# Patient Record
Sex: FEMALE | Race: WHITE | NOT HISPANIC OR LATINO | Employment: UNEMPLOYED | URBAN - METROPOLITAN AREA
[De-identification: names, ages, dates, MRNs, and addresses within clinical notes are randomized per-mention and may not be internally consistent; named-entity substitution may affect disease eponyms.]

---

## 2024-02-29 ENCOUNTER — OFFICE VISIT (OUTPATIENT)
Dept: PRIMARY CARE | Facility: CLINIC | Age: 44
End: 2024-02-29
Payer: MEDICARE

## 2024-02-29 VITALS
OXYGEN SATURATION: 98 % | SYSTOLIC BLOOD PRESSURE: 116 MMHG | HEART RATE: 85 BPM | WEIGHT: 155 LBS | BODY MASS INDEX: 28.52 KG/M2 | DIASTOLIC BLOOD PRESSURE: 74 MMHG | HEIGHT: 62 IN

## 2024-02-29 DIAGNOSIS — Z13.220 LIPID SCREENING: ICD-10-CM

## 2024-02-29 DIAGNOSIS — G43.811 OTHER MIGRAINE WITH STATUS MIGRAINOSUS, INTRACTABLE: ICD-10-CM

## 2024-02-29 DIAGNOSIS — K21.00 GASTROESOPHAGEAL REFLUX DISEASE WITH ESOPHAGITIS WITHOUT HEMORRHAGE: Primary | ICD-10-CM

## 2024-02-29 DIAGNOSIS — G47.00 INSOMNIA, UNSPECIFIED TYPE: ICD-10-CM

## 2024-02-29 PROBLEM — K21.9 ACID REFLUX: Status: ACTIVE | Noted: 2024-02-29

## 2024-02-29 PROBLEM — E55.9 VITAMIN D DEFICIENCY: Status: ACTIVE | Noted: 2024-02-29

## 2024-02-29 PROCEDURE — 99214 OFFICE O/P EST MOD 30 MIN: CPT | Performed by: NURSE PRACTITIONER

## 2024-02-29 PROCEDURE — 1036F TOBACCO NON-USER: CPT | Performed by: NURSE PRACTITIONER

## 2024-02-29 RX ORDER — PANTOPRAZOLE SODIUM 40 MG/1
40 TABLET, DELAYED RELEASE ORAL DAILY
Qty: 30 TABLET | Refills: 5 | Status: SHIPPED | OUTPATIENT
Start: 2024-02-29

## 2024-02-29 ASSESSMENT — ENCOUNTER SYMPTOMS: ABDOMINAL PAIN: 1

## 2024-02-29 NOTE — PROGRESS NOTES
"Subjective   Patient ID: Ratna Bauer is a 43 y.o. female who presents for Migraine (Patient has a headache for the past couple days. Patient has taken ibuprofen. ), Nausea, and Abdominal Pain (Patient stated that abdominal pain has started last night ).    43-year-old female here for acute visit.  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  She has not seen GYN in over a year.    X 2 months- fatigued. Changed diet was sick in Dec then fell off track.   Started 2 days ago started with headache then dizzy no vertigo. Gets nauseated. If move head too fast will get dizzy.   HX migraine. Has only taken advil and a medicine from her  \"he is a nurse practitioner and says they are good\". Does not know the name of the medication.   In the past had been so dizzy could not get out of bed   HX Gastritis which has been bad in the last 2 days. Was on meds but ran out. Pain all night last night epigastric area. Could not sleep last night due to the pain. Has not seen GI for this.   8/2022 saw Dr Dick was treated for same S&S started on pantoprazole 40 mg po every day. Pt states this worked well. Pain went away. She ran out of meds and the pain is back.     Migraine   Associated symptoms include abdominal pain.   Abdominal Pain         Review of Systems   Gastrointestinal:  Positive for abdominal pain.       Objective   /74   Pulse 85   Ht 1.575 m (5' 2\")   Wt 70.3 kg (155 lb)   SpO2 98%   BMI 28.35 kg/m²     Physical Exam  Constitutional:       Appearance: Normal appearance.   Cardiovascular:      Rate and Rhythm: Normal rate and regular rhythm.   Pulmonary:      Effort: Pulmonary effort is normal.      Breath sounds: Normal breath sounds.   Abdominal:      General: Abdomen is flat. Bowel sounds are normal.      Palpations: Abdomen is soft.      Comments: She has mild pressure in the epigastric area with deep palpation otherwise no abdominal pain   Neurological:      Mental Status: She is alert and " oriented to person, place, and time.   Psychiatric:         Mood and Affect: Mood normal.         Assessment/Plan   Diagnoses and all orders for this visit:  Gastroesophageal reflux disease with esophagitis without hemorrhage  Comments:  Restart the PPI.  Pantoprazole 40 mg p.o. daily.  If no improvement will need to see GI  Orders:  -     Comprehensive Metabolic Panel; Future  -     CBC and Auto Differential; Future  -     TSH with reflex to Free T4 if abnormal; Future  -     pantoprazole (ProtoNix) 40 mg EC tablet; Take 1 tablet (40 mg) by mouth once daily. Do not crush, chew, or split.  Insomnia, unspecified type  Comments:  I will not prescribe sleeping pills for her.  She is aware of that.  Try over-the-counter magnesium glycinate 1 to 200 mg p.o. nightly  Orders:  -     TSH with reflex to Free T4 if abnormal; Future  Other migraine with status migrainosus, intractable  Comments:  History of migraine uses OTC meds.  This 1 is not resolving.  Sample of Nurtec given 75 mg x 1 now can repeat again after 2 hours.  New prescription sent  Orders:  -     TSH with reflex to Free T4 if abnormal; Future  -     rimegepant (Nurtec ODT) 75 mg tablet,disintegrating; Take 1 tab at onset of headache. If headache persists >2 hrs ok to take 2nd tab.  Lipid screening  Comments:  Update fasting labs  Orders:  -     Lipid Panel; Future  Other orders  -     Follow Up In Primary Care - Health Maintenance; Future    Spent a long time since she has had blood work on to make sure that nothing is missed.  Will update CBC, CMP, TSH, fasting lipid panel  Follow-up in 1 month to make sure she is doing well

## 2024-03-01 ENCOUNTER — LAB (OUTPATIENT)
Dept: LAB | Facility: LAB | Age: 44
End: 2024-03-01
Payer: MEDICARE

## 2024-03-01 DIAGNOSIS — Z13.220 LIPID SCREENING: ICD-10-CM

## 2024-03-01 DIAGNOSIS — G43.811 OTHER MIGRAINE WITH STATUS MIGRAINOSUS, INTRACTABLE: ICD-10-CM

## 2024-03-01 DIAGNOSIS — G47.00 INSOMNIA, UNSPECIFIED TYPE: ICD-10-CM

## 2024-03-01 DIAGNOSIS — K21.00 GASTROESOPHAGEAL REFLUX DISEASE WITH ESOPHAGITIS WITHOUT HEMORRHAGE: ICD-10-CM

## 2024-03-01 LAB
ALBUMIN SERPL BCP-MCNC: 3.9 G/DL (ref 3.4–5)
ALP SERPL-CCNC: 94 U/L (ref 33–110)
ALT SERPL W P-5'-P-CCNC: 12 U/L (ref 7–45)
ANION GAP SERPL CALC-SCNC: 12 MMOL/L (ref 10–20)
AST SERPL W P-5'-P-CCNC: 13 U/L (ref 9–39)
BASOPHILS # BLD AUTO: 0.03 X10*3/UL (ref 0–0.1)
BASOPHILS NFR BLD AUTO: 0.5 %
BILIRUB SERPL-MCNC: 0.4 MG/DL (ref 0–1.2)
BUN SERPL-MCNC: 15 MG/DL (ref 6–23)
CALCIUM SERPL-MCNC: 9.8 MG/DL (ref 8.6–10.3)
CHLORIDE SERPL-SCNC: 102 MMOL/L (ref 98–107)
CHOLEST SERPL-MCNC: 136 MG/DL (ref 0–199)
CHOLESTEROL/HDL RATIO: 3.7
CO2 SERPL-SCNC: 29 MMOL/L (ref 21–32)
CREAT SERPL-MCNC: 0.82 MG/DL (ref 0.5–1.05)
EGFRCR SERPLBLD CKD-EPI 2021: >90 ML/MIN/1.73M*2
EOSINOPHIL # BLD AUTO: 0.08 X10*3/UL (ref 0–0.7)
EOSINOPHIL NFR BLD AUTO: 1.2 %
ERYTHROCYTE [DISTWIDTH] IN BLOOD BY AUTOMATED COUNT: 17.3 % (ref 11.5–14.5)
GLUCOSE SERPL-MCNC: 90 MG/DL (ref 74–99)
HCT VFR BLD AUTO: 38.6 % (ref 36–46)
HDLC SERPL-MCNC: 36.9 MG/DL
HGB BLD-MCNC: 11.3 G/DL (ref 12–16)
IMM GRANULOCYTES # BLD AUTO: 0.01 X10*3/UL (ref 0–0.7)
IMM GRANULOCYTES NFR BLD AUTO: 0.2 % (ref 0–0.9)
LDLC SERPL CALC-MCNC: 85 MG/DL
LYMPHOCYTES # BLD AUTO: 1.95 X10*3/UL (ref 1.2–4.8)
LYMPHOCYTES NFR BLD AUTO: 30.1 %
MCH RBC QN AUTO: 24.7 PG (ref 26–34)
MCHC RBC AUTO-ENTMCNC: 29.3 G/DL (ref 32–36)
MCV RBC AUTO: 85 FL (ref 80–100)
MONOCYTES # BLD AUTO: 0.46 X10*3/UL (ref 0.1–1)
MONOCYTES NFR BLD AUTO: 7.1 %
NEUTROPHILS # BLD AUTO: 3.95 X10*3/UL (ref 1.2–7.7)
NEUTROPHILS NFR BLD AUTO: 60.9 %
NON HDL CHOLESTEROL: 99 MG/DL (ref 0–149)
NRBC BLD-RTO: 0 /100 WBCS (ref 0–0)
PLATELET # BLD AUTO: 323 X10*3/UL (ref 150–450)
POTASSIUM SERPL-SCNC: 4.6 MMOL/L (ref 3.5–5.3)
PROT SERPL-MCNC: 7 G/DL (ref 6.4–8.2)
RBC # BLD AUTO: 4.57 X10*6/UL (ref 4–5.2)
SODIUM SERPL-SCNC: 138 MMOL/L (ref 136–145)
TRIGL SERPL-MCNC: 73 MG/DL (ref 0–149)
TSH SERPL-ACNC: 0.93 MIU/L (ref 0.44–3.98)
VLDL: 15 MG/DL (ref 0–40)
WBC # BLD AUTO: 6.5 X10*3/UL (ref 4.4–11.3)

## 2024-03-01 PROCEDURE — 80061 LIPID PANEL: CPT

## 2024-03-01 PROCEDURE — 80053 COMPREHEN METABOLIC PANEL: CPT

## 2024-03-01 PROCEDURE — 85025 COMPLETE CBC W/AUTO DIFF WBC: CPT

## 2024-03-01 PROCEDURE — 36415 COLL VENOUS BLD VENIPUNCTURE: CPT

## 2024-03-01 PROCEDURE — 84443 ASSAY THYROID STIM HORMONE: CPT

## 2024-03-04 ENCOUNTER — TELEPHONE (OUTPATIENT)
Dept: PRIMARY CARE | Facility: CLINIC | Age: 44
End: 2024-03-04
Payer: MEDICARE

## 2024-03-04 NOTE — TELEPHONE ENCOUNTER
Called and spoke with patient that her blood work looks pretty good.  She is a little bit anemic approximately the same as she was in 2022.  Trice would like to recheck this at her next visit once she has been on the PPI.   Otherwise kidney, liver, sugar, thyroid and lipids all look very good   Patient understood.

## 2024-05-29 ENCOUNTER — APPOINTMENT (OUTPATIENT)
Dept: PRIMARY CARE | Facility: CLINIC | Age: 44
End: 2024-05-29
Payer: MEDICARE

## 2024-07-11 ENCOUNTER — OFFICE VISIT (OUTPATIENT)
Dept: PRIMARY CARE | Facility: CLINIC | Age: 44
End: 2024-07-11
Payer: MEDICARE

## 2024-07-11 VITALS
SYSTOLIC BLOOD PRESSURE: 110 MMHG | HEART RATE: 66 BPM | BODY MASS INDEX: 27.64 KG/M2 | WEIGHT: 156 LBS | DIASTOLIC BLOOD PRESSURE: 74 MMHG | HEIGHT: 63 IN | OXYGEN SATURATION: 97 %

## 2024-07-11 DIAGNOSIS — Z13.220 LIPID SCREENING: Primary | ICD-10-CM

## 2024-07-11 DIAGNOSIS — Z00.00 WELLNESS EXAMINATION: ICD-10-CM

## 2024-07-11 DIAGNOSIS — K21.00 GASTROESOPHAGEAL REFLUX DISEASE WITH ESOPHAGITIS WITHOUT HEMORRHAGE: ICD-10-CM

## 2024-07-11 DIAGNOSIS — D50.9 IRON DEFICIENCY ANEMIA, UNSPECIFIED IRON DEFICIENCY ANEMIA TYPE: ICD-10-CM

## 2024-07-11 PROCEDURE — 1036F TOBACCO NON-USER: CPT | Performed by: NURSE PRACTITIONER

## 2024-07-11 PROCEDURE — 99213 OFFICE O/P EST LOW 20 MIN: CPT | Performed by: NURSE PRACTITIONER

## 2024-07-11 RX ORDER — PANTOPRAZOLE SODIUM 40 MG/1
40 TABLET, DELAYED RELEASE ORAL DAILY
Qty: 30 TABLET | Refills: 5 | Status: SHIPPED | OUTPATIENT
Start: 2024-07-11

## 2024-07-11 ASSESSMENT — PATIENT HEALTH QUESTIONNAIRE - PHQ9
1. LITTLE INTEREST OR PLEASURE IN DOING THINGS: NOT AT ALL
SUM OF ALL RESPONSES TO PHQ9 QUESTIONS 1 AND 2: 0
2. FEELING DOWN, DEPRESSED OR HOPELESS: NOT AT ALL

## 2024-07-11 NOTE — PROGRESS NOTES
"Subjective   Patient ID: Ratna Bauer is a 43 y.o. female who presents for Med Refill.    43 year old female here for refills. No acute concerns.   GERD, on pantoprazole. No reflux S&S. Has not seen GI.  States she has been to eat \"all the spicy foods a lot \"and no symptoms.  Labs from 3/2024 reviewed HGB minimally low 11.3. no change since 2022.   Overdue for annual physical and screening.    Med Refill         Review of Systems    Objective   /74   Pulse 66   Ht 1.6 m (5' 3\")   Wt 70.8 kg (156 lb)   SpO2 97%   BMI 27.63 kg/m²     Physical Exam  Constitutional:       Appearance: Normal appearance.   Cardiovascular:      Rate and Rhythm: Normal rate and regular rhythm.      Pulses: Normal pulses.      Heart sounds: Normal heart sounds.   Pulmonary:      Effort: Pulmonary effort is normal.      Breath sounds: Normal breath sounds.   Musculoskeletal:         General: Normal range of motion.   Neurological:      General: No focal deficit present.      Mental Status: She is alert and oriented to person, place, and time. Mental status is at baseline.   Psychiatric:         Mood and Affect: Mood normal.         Behavior: Behavior normal.         Assessment/Plan   Diagnoses and all orders for this visit:  Gastroesophageal reflux disease with esophagitis without hemorrhage  Comments:  Cont the  Pantoprazole 40 mg p.o. daily.  Orders:  -     pantoprazole (ProtoNix) 40 mg EC tablet; Take 1 tablet (40 mg) by mouth once daily. Do not crush, chew, or split.  Other orders  -     Follow Up In Primary Care - Health Maintenance; Future    Schedule her for September for annual physical including age-appropriate screening.  Update blood work.  Patient is agreeable to get her blood work done prior to that visit.     "

## 2024-09-11 ENCOUNTER — HOSPITAL ENCOUNTER (OUTPATIENT)
Dept: RADIOLOGY | Facility: HOSPITAL | Age: 44
Discharge: HOME | End: 2024-09-11
Payer: MEDICARE

## 2024-09-11 ENCOUNTER — APPOINTMENT (OUTPATIENT)
Dept: PRIMARY CARE | Facility: CLINIC | Age: 44
End: 2024-09-11
Payer: MEDICARE

## 2024-09-11 VITALS — WEIGHT: 157 LBS | BODY MASS INDEX: 27.82 KG/M2 | HEIGHT: 63 IN

## 2024-09-11 VITALS
OXYGEN SATURATION: 98 % | SYSTOLIC BLOOD PRESSURE: 119 MMHG | HEIGHT: 63 IN | BODY MASS INDEX: 27.82 KG/M2 | WEIGHT: 157 LBS | DIASTOLIC BLOOD PRESSURE: 70 MMHG | HEART RATE: 70 BPM

## 2024-09-11 DIAGNOSIS — Z00.00 WELLNESS EXAMINATION: Primary | ICD-10-CM

## 2024-09-11 DIAGNOSIS — Z12.31 BREAST CANCER SCREENING BY MAMMOGRAM: ICD-10-CM

## 2024-09-11 DIAGNOSIS — Z13.220 LIPID SCREENING: ICD-10-CM

## 2024-09-11 PROCEDURE — 77067 SCR MAMMO BI INCL CAD: CPT

## 2024-09-11 PROCEDURE — 99396 PREV VISIT EST AGE 40-64: CPT | Performed by: NURSE PRACTITIONER

## 2024-09-11 PROCEDURE — 77063 BREAST TOMOSYNTHESIS BI: CPT | Performed by: RADIOLOGY

## 2024-09-11 PROCEDURE — 1036F TOBACCO NON-USER: CPT | Performed by: NURSE PRACTITIONER

## 2024-09-11 PROCEDURE — 77067 SCR MAMMO BI INCL CAD: CPT | Performed by: RADIOLOGY

## 2024-09-11 PROCEDURE — 3008F BODY MASS INDEX DOCD: CPT | Performed by: NURSE PRACTITIONER

## 2024-09-11 ASSESSMENT — ENCOUNTER SYMPTOMS: BREAST PAIN: 1

## 2024-09-11 NOTE — PROGRESS NOTES
"Subjective   Patient ID: Ratna Bauer is a 43 y.o. female who presents for Breast Pain (Patient mentioned left breast pain for the past couple weeks. Patient states that the pain increases and decreases depending on the day. ).    43 year old female here for her annual CPE. She is here to establish care, her PCP is no longer in this practice  LT breast pain. Comes and goes. Started 2 weeks ago started under the breast tissue. Now this area only hurts with palpation. No other areas of pain. Her menstrual cycle occurred during this time of pain/tenderness  Wears sport bra- no underwire.   Has not had routine screening since 2022.   Labs reviewed from 3/2024- mild anemia    Prevention:  Breast Ca screen: last mamm 2022  Colon Ca Screen: no fam HX, screening not warranted due to age  Lung Ca Screen: no fam HX. Non smoker.  DEXA: not warranted. No fam HX OP  CT Cardiac Score: no fam HX  Diabetes Screen: recent fasting BS nml.   Opthal: no recent exam. In the past year has had diff reading  Dental: routine exams  Exercise: 4 times a week  Diet: caffeine- tea.  Sugar- not often- uses foods as a sweetener.          Breast Pain  Associated Symptoms: breast pain         Review of Systems    Objective   /70   Pulse 70   Ht 1.6 m (5' 3\")   Wt 71.2 kg (157 lb)   SpO2 98%   BMI 27.81 kg/m²     Physical Exam  Vitals reviewed.   Constitutional:       General: She is not in acute distress.     Appearance: Normal appearance.   HENT:      Head: Normocephalic and atraumatic.   Eyes:      Extraocular Movements: Extraocular movements intact.      Conjunctiva/sclera: Conjunctivae normal.      Pupils: Pupils are equal, round, and reactive to light.   Neck:      Vascular: No carotid bruit.   Cardiovascular:      Rate and Rhythm: Normal rate and regular rhythm.      Pulses: Normal pulses.      Heart sounds: No murmur heard.  Pulmonary:      Effort: Pulmonary effort is normal.      Breath sounds: Normal breath sounds.   Chest: "   Breasts:     Right: No swelling, inverted nipple, mass, nipple discharge or skin change.      Left: No swelling, inverted nipple, mass, nipple discharge or skin change.          Comments: Dense almost nodular breast tissue bilateral breasts.  Deep palpation causes some tenderness on exam.  No other areas of abnormality including tenderness.  Abdominal:      General: Bowel sounds are normal. There is no distension.      Palpations: Abdomen is soft.      Tenderness: There is no abdominal tenderness.   Musculoskeletal:         General: Normal range of motion.      Cervical back: Normal range of motion and neck supple.   Lymphadenopathy:      Cervical: No cervical adenopathy.      Upper Body:      Right upper body: No axillary or pectoral adenopathy.      Left upper body: No axillary or pectoral adenopathy.   Skin:     General: Skin is warm and dry.   Neurological:      General: No focal deficit present.      Mental Status: She is alert and oriented to person, place, and time. Mental status is at baseline.   Psychiatric:         Mood and Affect: Mood normal.         Behavior: Behavior normal.         Assessment/Plan   Diagnoses and all orders for this visit:  Wellness examination  Comments:  Overdue for wellness check.  Update breast cancer screening with mammogram  Breast cancer screening by mammogram  -     BI mammo bilateral screening tomosynthesis; Future  Lipid screening  Other orders  -     Follow Up In Primary Care - Health Maintenance; Future       The breast pain she is describing is most likely hormonally related.  She has dense breast tissue on exam and according to her previous mammogram.  I cannot palpate any definitive nodules.  I do feel the dense breast tissue equally bilateral lower half of the breast tissue

## 2024-09-18 ENCOUNTER — APPOINTMENT (OUTPATIENT)
Dept: PRIMARY CARE | Facility: CLINIC | Age: 44
End: 2024-09-18
Payer: MEDICARE

## 2024-09-25 ENCOUNTER — APPOINTMENT (OUTPATIENT)
Dept: PRIMARY CARE | Facility: CLINIC | Age: 44
End: 2024-09-25
Payer: MEDICARE

## 2025-04-03 ENCOUNTER — APPOINTMENT (OUTPATIENT)
Dept: PRIMARY CARE | Facility: CLINIC | Age: 45
End: 2025-04-03
Payer: MEDICARE

## 2025-04-03 ENCOUNTER — HOSPITAL ENCOUNTER (OUTPATIENT)
Dept: RADIOLOGY | Facility: HOSPITAL | Age: 45
Discharge: HOME | End: 2025-04-03
Payer: MEDICARE

## 2025-04-03 VITALS
BODY MASS INDEX: 27.46 KG/M2 | WEIGHT: 155 LBS | SYSTOLIC BLOOD PRESSURE: 115 MMHG | HEART RATE: 67 BPM | DIASTOLIC BLOOD PRESSURE: 78 MMHG | OXYGEN SATURATION: 97 % | HEIGHT: 63 IN

## 2025-04-03 DIAGNOSIS — G89.29 GROIN PAIN, CHRONIC, RIGHT: ICD-10-CM

## 2025-04-03 DIAGNOSIS — M25.551 PAIN OF RIGHT HIP: Primary | ICD-10-CM

## 2025-04-03 DIAGNOSIS — M25.551 PAIN OF RIGHT HIP: ICD-10-CM

## 2025-04-03 DIAGNOSIS — R10.31 GROIN PAIN, CHRONIC, RIGHT: ICD-10-CM

## 2025-04-03 PROCEDURE — 99214 OFFICE O/P EST MOD 30 MIN: CPT | Performed by: NURSE PRACTITIONER

## 2025-04-03 PROCEDURE — 73502 X-RAY EXAM HIP UNI 2-3 VIEWS: CPT | Mod: RT

## 2025-04-03 PROCEDURE — 3008F BODY MASS INDEX DOCD: CPT | Performed by: NURSE PRACTITIONER

## 2025-04-03 PROCEDURE — 1036F TOBACCO NON-USER: CPT | Performed by: NURSE PRACTITIONER

## 2025-04-03 ASSESSMENT — PATIENT HEALTH QUESTIONNAIRE - PHQ9
2. FEELING DOWN, DEPRESSED OR HOPELESS: NOT AT ALL
1. LITTLE INTEREST OR PLEASURE IN DOING THINGS: NOT AT ALL
SUM OF ALL RESPONSES TO PHQ9 QUESTIONS 1 AND 2: 0

## 2025-04-03 ASSESSMENT — ENCOUNTER SYMPTOMS: HIP PAIN: 1

## 2025-04-03 NOTE — PROGRESS NOTES
"Subjective   Patient ID: Ratna Bauer is a 44 y.o. female who presents for Hip Pain (Patient is here today to discuss ongoing bilateral hip pain. Patient states she has been taking ibuprofen as needed. ).    44 year old female here for acute visit. Pain in bilat hips. LT alternate RT. Right now the RT is painful  Has not had this worked up in the past  Certain movements cause an increase in pain  No radiation.   She points to the inner groin.  Occasional pain in knees.   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 Recently in the gym pushed a heavy weight off to the side with RT leg then developed the pain  Will occasionally feel like the leg gets taught and cannot move the leg without extreme pain.     Hip Pain          Review of Systems    Objective   /78   Pulse 67   Ht 1.6 m (5' 3\")   Wt 70.3 kg (155 lb)   SpO2 97%   BMI 27.46 kg/m²     Physical Exam  Constitutional:       Appearance: Normal appearance.   Cardiovascular:      Rate and Rhythm: Normal rate and regular rhythm.      Pulses: Normal pulses.   Pulmonary:      Effort: Pulmonary effort is normal.   Neurological:      General: No focal deficit present.      Mental Status: She is alert and oriented to person, place, and time. Mental status is at baseline.   Psychiatric:         Mood and Affect: Mood normal.         Behavior: Behavior normal.         Assessment/Plan   Diagnoses and all orders for this visit:  Pain of right hip  Comments:  I think tight muscles/tendons. I showed her some stretches. get Xray to make sure Hip is in good alignment  Orders:  -     XR hip right with pelvis when performed 2 or 3 views; Future  Groin pain, chronic, right  -     XR hip right with pelvis when performed 2 or 3 views; Future         "

## 2025-04-07 DIAGNOSIS — M25.551 PAIN OF RIGHT HIP: Primary | ICD-10-CM

## 2025-05-16 ENCOUNTER — EVALUATION (OUTPATIENT)
Dept: PHYSICAL THERAPY | Facility: CLINIC | Age: 45
End: 2025-05-16
Payer: MEDICARE

## 2025-05-16 DIAGNOSIS — M25.551 PAIN OF RIGHT HIP: ICD-10-CM

## 2025-05-16 DIAGNOSIS — M62.89 MUSCLE TIGHTNESS: ICD-10-CM

## 2025-05-16 DIAGNOSIS — R29.898 WEAKNESS OF RIGHT LEG: Primary | ICD-10-CM

## 2025-05-16 PROCEDURE — 97161 PT EVAL LOW COMPLEX 20 MIN: CPT | Mod: GP

## 2025-05-16 PROCEDURE — 97110 THERAPEUTIC EXERCISES: CPT | Mod: GP

## 2025-05-16 NOTE — PROGRESS NOTES
Physical Therapy    Physical Therapy Evaluation and Treatment      Patient Name: Ratna Bauer  MRN: [id number]  Today's Date: 5/16/2025    Time Entry:   Time Calculation  Start Time: 1018  Stop Time: 1108  Time Calculation (min): 50 min  PT Evaluation Time Entry  PT Evaluation (Low) Time Entry: 40  PT Therapeutic Procedures Time Entry  Therapeutic Exercise Time Entry: 10        Assessment:  Patient is a 44 year old  woman who presents to Physical therapy secondary to intermittent Right hip pain for about one month duration. Right hip pain is intermittent, severe, debilitating but fleeting in nature.  Once she gently moves her right hip the pain will diminish.   Upon PT evaluation the patient is presenting with the following deficits: painful R hip end range IR and ER, positive WILFREDO, minimal weakness R hip and core, muscle restrictions in RLE.    The above deficits are limiting patient's ability to return to previous exercise fitness program, significant  difficulty walking when she is having an episode of pain.  Secondary to the above deficits the patient will benefit from skilled PT intervention to decrease right hip pain and restore to unrestricted PLOF.           Plan:  OP PT Plan  Treatment/Interventions: Cryotherapy, Education/ Instruction, Manual therapy, Therapeutic activities, Therapeutic exercises, Taping techniques  PT Plan: Skilled PT  PT Frequency: 1 time per week  Duration: 4-6 wks  Number of Treatments Authorized: Auth Required  Rehab Potential: Excellent  Plan of Care Agreement: Patient    Current Problem:   1. Weakness of right leg        2. Pain of right hip  Referral to Physical Therapy      3. Muscle tightness               General:  General  Reason for Referral: Right hip pain  Referred By: JOSH Olvera-CNP  General Comment: Visit 1   Insurance: 2025: AUTH REQ. 50.00 CO PAY, 100% COVERAGE, 20V PT-MAX, 9200 OOP- NOT MET, [employer]    Precautions:  Precautions  Precautions Comment:  None noted in EMR or reported by patient.    Subjective:   Chief Complaint: Patient presents to clinic right anterior hip pain and groin pain for about one month. May have started after weight lifting , but no significant trauma or injury.  She has since stopped working out as hard and with less weights.  Denies numbness or tingling, swelling in right hip or leg.  Denies LBP or SI joint pain.     Current Condition:   Better    Pain:  Location: Right groin  Description: severe sharp pain, extremely hard to move leg, intermittent, fleeting in nature.  Constant soreness   Aggravating Factors:  moving leg out to side, or twisting leg. Sometimes it just comes on if she moves wrong.   Relieving Factors:  gentle movement of right leg such as heel slides.  Currently rated 2/10,  worst 9/10,  best 0/10    Functional limitations:  Much difficulty walking or moving when she is in an episode of pain.  Lasts less than an hour.  Resolves once she moves her joint correctly. Unable to participate in regular fitness program.     Patient goals:  No hip pain. Return to PLOF and working out.     Relevant Information (PMH & Previous Tests/Imaging):   XR Hip 4/3/25  IMPRESSION:  1. No significant degenerative changes or acute radiographic findings  of the right hip. Right SI joint degenerative changes with  osteophytosis.  2. Sclerotic focus likely representing bone island in the right  femoral neck.    Previous Interventions/Treatments:   No treatment.    Prior Level of Function (PLOF)  Patient previously independent with all ADLs  Exercise/Physical Activity: works out daily.  Work/School: unemployed     Patients Living Environment: Reviewed and no concern  Lives with family.    Primary Language: English    Red Flags/Review of Systems:  (-) osteoporosis  (-) cough/sneeze  (-) balance difficulties/FALLS  (-) numbness/tingling  (+) weakness  (-) unremitting night pain  (-) AM stiffness  (-) unexplained weight loss  (-) history of  cancer  (-) bowel or bladder changes  (-) pacemaker  (-) metal implants    Negative Signs of DVT including: Pain, swelling or tenderness to calf, warm or red skin, previous history of DVT     Objective     Posture  Min FH, minimal APT and B knee hyperextension.    Gait  Ambulates  indep without assistive device with good gait pattern and good balance except slight right hip circumduction during swing phase.     ROM   BLE' s WFL/WNL and painfree except min soreness end range  right hip internal and external rotation.    Observation/palpation  Level pelvis in standing and supine  No leg length discrepancy noted in standing or supine   Negative R/L Squish test     Flexibility   Hamstrings B fair  ITB R poor, L fair  Hip flexors B fair  Piriformis B good  Quads B good   Gastrocs B fair    Strength  Hip flexion supine R 4+/5 min tension  in hip, L 5/5  Hip adduction R 4+/5, L 5/5  Bridges 100%  Hip abduction sidelying R 4+/5 min soreness, L 5/5   Hip extension prone  R 4/5 min pain and decreased DLS,  L 5/5   Knee extension R 5/5, L 5/5   Knee flexion prone R 4+/5, L 5/5   Ankle DF R 5/5, L 5/5    Abdominals good/fair  Core/DLS good/fair    Special Tests  FADIR Test - negative B  WILFREDO (Owen Test) - negative L, min soreness R   Scour Test - LT NT,  R min soreness    Outcome Measures:  Other Measures  Lower Extremity Funtional Score (LEFS): score 77/80     Treatments:  Patient instructed in and performed the following exercises to be performed as HEP.  See below for details.  Patient advised to perform exercises within painfree ROM and without lasting increase in pain.  Patient verbalizes understanding and agreement.         EDUCATION:  Written instructions and purple theraband provided for the following HEP.   Access Code: [email or web address]  URL: [email or web address]  Date: 05/16/2025  Prepared by: Sandra Castaneda    Program Notes  STOP any exercise that causes a lasting increase in pain.     Exercises  -  "Heel Toe Raises with Counter Support  - 1 x daily - 7 x weekly - 2 sets - 10 reps  - Lateral Step Down  - 1 x daily - 7 x weekly - 2 sets - 10 reps  - Standing Hamstring Curl with Chair Support  - 1 x daily - 7 x weekly - 2 sets - 10 reps  - Sit to Stand  - 2 x daily - 7 x weekly - 1 sets - 10 reps  - Standing Hamstring Stretch with Step  - 1 x daily - 7 x weekly - 1 sets - 3 reps - 20\" hold  - Step Up  - 1 x daily - 7 x weekly - 2 sets - 10 reps  - Standing Bilateral Gastroc Stretch with Step  - 1 x daily - 7 x weekly - 1 sets - 3 reps - 20\" hold  - Single Leg Stance  - 1 x daily - 7 x weekly - 2 sets - 10 reps    Outpatient Education  Individual(s) Educated: Patient  Education Provided: Home Exercise Program, POC  Risk and Benefits Discussed with Patient/Caregiver/Other: yes  Patient/Caregiver Demonstrated Understanding: yes  Plan of Care Discussed and Agreed Upon: yes    Goals:  Active       PT Problem       Pain       Start:  05/16/25    Expected End:  07/11/25       Patient will report reduction of pain by 90%  to ease performance of all ADL's,  leisure activities and household tasks in order to return to PLOF.           ROM       Start:  05/16/25    Expected End:  07/11/25       Patient will increase AROM of R hip to WNL without pain in order to squat, perform all functional mobility and  return to normal fitness program at the gym without pain or restrictions.         Strength       Start:  05/16/25    Expected End:  07/11/25       Patient will increase core and R  hip strength to 5/5 without pain  in order to  participate without restriction in ADL, IADL, leisure activities, household tasks and normal fitness gym program.          HEP       Start:  05/16/25    Expected End:  07/11/25       Patient will be independent and compliant with safe and recommended  HEP to enhance functional progress and long term management of condition.           Patient Goal       Start:  05/16/25    Expected End:  07/11/25       " Patient will return to gym program without pain or restrictions due to right hip pain.            Date of Evaluation: 5/16/25  Onset Date: 4/7/25  Referring Physician: JOSH Olvera-ANDREW Castaneda, PT  CPT Codes: Therapeutic Exercise: 61681, Therapeutic Activity: 67143, Manual Therapy: 03907, and Vasopneumatic Device: 16219   Diagnosis:   Problem List Items Addressed This Visit           ICD-10-CM    Muscle tightness M62.89    Weakness of right leg - Primary R29.898     Other Visit Diagnoses         Codes      Pain of right hip     M25.551          Outcome: Lower Extremity Funtional Score (LEFS): score 77/80   Autism: No  PT Evaluation Complexity: Low  01638  Which of the following best describes the primary reason of therapy: Improving, restoring, or adapting functional mobility or skills  Surgery within last 3 months: No  Select all conditions that apply: None of these apply  Visits Requested: 6-8    Social Determinants of health:   Money    No  physical home environment   No  no job/ work conditions         No  education/literacy                   No  Childhood experiences           No  social supports/coping skills  No  unhealthy behaviors                   No  Limited access to healthcare               No

## 2025-05-23 ENCOUNTER — TREATMENT (OUTPATIENT)
Dept: PHYSICAL THERAPY | Facility: CLINIC | Age: 45
End: 2025-05-23
Payer: MEDICARE

## 2025-05-23 DIAGNOSIS — R29.898 WEAKNESS OF RIGHT LEG: Primary | ICD-10-CM

## 2025-05-23 DIAGNOSIS — M62.89 MUSCLE TIGHTNESS: ICD-10-CM

## 2025-05-23 DIAGNOSIS — M25.551 PAIN OF RIGHT HIP: ICD-10-CM

## 2025-05-23 PROCEDURE — 97110 THERAPEUTIC EXERCISES: CPT | Mod: GP

## 2025-05-23 NOTE — PROGRESS NOTES
"Physical Therapy                                                                                  Physical Therapy Treatment       Patient name: Ratna Bauer  MRN:   [id number]  Today's Date: 5/23/2025  2      Time Calculation  Start Time: 0950  Stop Time: 1031  Time Calculation (min): 41 min    Assessment:  Patient completed session today with good effort .  Patient tolerated treatment well without increased complaints of pain during or after session. Patient will continue to benefit from PT to improve ROM and strength of right hip in order to decrease pain.         Plan:        Monitor response to treatment and adjust plan as needed.   To continue with current POC with emphasis on stretching and strengthening.         Current Problems:       1. Weakness of right leg        2. Muscle tightness        3. Pain of right hip  Follow Up In Physical Therapy                General  Reason for Referral: Right hip pain  Referred By: JOSH Olvera-CNP  General Comment: Visit 2   Insurance: 2025: AUTH REQ. 50.00 CO PAY, 100% COVERAGE, 20V PT-MAX, 9200 OOP- NOT MET, [employer]    Precautions  Precautions Comment: None noted in EMR or reported by patient.    Subjective:  Patient reported no new complaints.  Reports right groin pain when she moves her leg, rated 2/10.  No pain at rest.      Response to last session:   no worse  Performing HEP: yes    Pain  2/10 right groin with movement at the start of session  No complaints of pain during or after session today.         Treatment:   Therapeutic exercises  Hooklying  Danyel repeated lumbar flexion in lying 1 x 15 reps   TA bracing with deep breathing 5\" hold 1 x 10 reps   TA bracing with B hip ADD ball squeeze 5\" hold 1 x 10 reps   Bridges with ball squeeze 5\" hold x 20 reps  Bridges with B hip ABD with purple theraband  5\" hold x 20 reps   TA bracing with alt R/L bent knee raise with purple theraband 2\" hold x 20     Sidelying  Clamshells R/L with purple theraband " "with feet elevated 3\" hold x 20 reps    Seated  B hip IR with ball squeeze 3\" hold 1 x 20     Standing   R/L hip flexor stretch  at steps 3 x 20\"     Education:  Written instructions and purple theraband provided for the following HEP.   Access Code: [email or web address]  (Need to verify access code next session)  URL: [email or web address]  Date: 05/16/2025  Prepared by: Sandra Castaneda     Program Notes  STOP any exercise that causes a lasting increase in pain.      Goals    Active       PT Problem       Pain       Start:  05/16/25    Expected End:  07/11/25       Patient will report reduction of pain by 90%  to ease performance of all ADL's,  leisure activities and household tasks in order to return to PLOF.           ROM       Start:  05/16/25    Expected End:  07/11/25       Patient will increase AROM of R hip to WNL without pain in order to squat, perform all functional mobility and  return to normal fitness program at the gym without pain or restrictions.         Strength       Start:  05/16/25    Expected End:  07/11/25       Patient will increase core and R  hip strength to 5/5 without pain  in order to  participate without restriction in ADL, IADL, leisure activities, household tasks and normal fitness gym program.          HEP       Start:  05/16/25    Expected End:  07/11/25       Patient will be independent and compliant with safe and recommended  HEP to enhance functional progress and long term management of condition.           Patient Goal       Start:  05/16/25    Expected End:  07/11/25       Patient will return to gym program without pain or restrictions due to right hip pain.                 "

## 2025-05-27 ENCOUNTER — APPOINTMENT (OUTPATIENT)
Dept: PHYSICAL THERAPY | Facility: CLINIC | Age: 45
End: 2025-05-27
Payer: MEDICARE

## 2025-05-27 DIAGNOSIS — M62.89 MUSCLE TIGHTNESS: ICD-10-CM

## 2025-05-27 DIAGNOSIS — R29.898 WEAKNESS OF RIGHT LEG: Primary | ICD-10-CM

## 2025-05-30 ENCOUNTER — APPOINTMENT (OUTPATIENT)
Dept: PHYSICAL THERAPY | Facility: CLINIC | Age: 45
End: 2025-05-30
Payer: MEDICARE

## 2025-05-30 ENCOUNTER — TREATMENT (OUTPATIENT)
Dept: PHYSICAL THERAPY | Facility: CLINIC | Age: 45
End: 2025-05-30
Payer: MEDICARE

## 2025-05-30 DIAGNOSIS — M62.89 MUSCLE TIGHTNESS: ICD-10-CM

## 2025-05-30 DIAGNOSIS — R29.898 WEAKNESS OF RIGHT LEG: Primary | ICD-10-CM

## 2025-05-30 DIAGNOSIS — M25.551 PAIN OF RIGHT HIP: ICD-10-CM

## 2025-05-30 PROCEDURE — 97110 THERAPEUTIC EXERCISES: CPT | Mod: GP

## 2025-05-30 NOTE — PROGRESS NOTES
"Physical Therapy                                                                                  Physical Therapy Treatment       Patient name: Ratna Bauer  MRN:   [id number]  Today's Date: 5/30/2025  3      Time Calculation  Start Time: 1020  Stop Time: 1101  Time Calculation (min): 41 min    Assessment:  Patient completed session today with good effort.  Patient tolerated treatment well without increased complaints of pain during or after session. Progressing well with present program.   Patient will continue to benefit from PT to improve ROM and strength of right hip in order to decrease pain.       Plan:        Monitor response to treatment and adjust plan as needed.   To continue with current POC with emphasis on stretching and strengthening right hip and core.         Current Problems:       1. Weakness of right leg        2. Muscle tightness        3. Pain of right hip  Follow Up In Physical Therapy                General  Reason for Referral: Right hip pain  Referred By: JOSH Olvera-CNP  General Comment: Visit 3   Insurance: 2025: [employer] 50.00 CO PAY, 100% COVERAGE, 20V PT-MAX, 9200 OOP- NOT MET, [employer]    Precautions  Precautions Comment: None noted in EMR or reported by patient.    Subjective:  Patient reported she had another episode of  sharp pain in right hip about 4 days ago.  The pain did not last, but resolved after she laid down and started to move her leg.  No other new complaints.   Response to last session:   felt good  Performing HEP: yes    Pain  Minimal soreness right lateral hip at the start of session.   No pain at the end of session.           Treatment:   Therapeutic exercises  Air dyne upright bike, seat 4 x 7 mins     Hooklying  TA bracing with deep breathing 5\" hold 1 x 15 reps   TA bracing with B hip ADD ball squeeze 5\" hold 1 x 10 reps   Bridges with ball squeeze 5\" hold x 20 reps  B hip ABD bent knee fall outs with purple theraband 5\" x 10 reps  Bridges with B " "hip ABD with purple theraband  5\" hold x 20 reps   TA bracing with alt R/L bent knee raise with purple theraband 2\" hold x 20     Deferred the following this date due to time constraints:  Sidelying  Clamshells R/L with purple theraband with feet elevated 3\" hold x 20 reps  Seated  B hip IR with ball squeeze 3\" hold 1 x 20   Standing   R/L hip flexor stretch  at steps 3 x 20\"     Education:  Written instructions and purple theraband provided for the following HEP.  Access Code: [email or web address]  URL: [email or web address]  Date: 05/30/2025  Prepared by: Sandra Castaneda    Exercises  - Supine Transversus Abdominis Bracing - Hands on Stomach  - 1 x daily - 7 x weekly - 2 sets - 10 reps - 5\" hold  - Supine Hip Adduction Isometric with Ball  - 1 x daily - 7 x weekly - 2 sets - 10 reps - 5\" hold  - Supine Bridge with Mini Swiss Ball Between Knees  - 1 x daily - 7 x weekly - 2 sets - 10 reps - 5\" hold  - Hooklying Clamshell with Resistance  - 1 x daily - 7 x weekly - 2 sets - 10 reps - 5\"  hold  - Supine Bridge with Resistance Band  - 1 x daily - 7 x weekly - 2 sets - 10 reps - 5 hold  - Supine March with Resistance Band  - 1 x daily - 7 x weekly - 2 sets - 10 reps          Goals    Active       PT Problem       Pain       Start:  05/16/25    Expected End:  07/11/25       Patient will report reduction of pain by 90%  to ease performance of all ADL's,  leisure activities and household tasks in order to return to PLOF.           ROM       Start:  05/16/25    Expected End:  07/11/25       Patient will increase AROM of R hip to WNL without pain in order to squat, perform all functional mobility and  return to normal fitness program at the gym without pain or restrictions.         Strength       Start:  05/16/25    Expected End:  07/11/25       Patient will increase core and R  hip strength to 5/5 without pain  in order to  participate without restriction in ADL, IADL, leisure activities, household tasks and " normal fitness gym program.          HEP       Start:  05/16/25    Expected End:  07/11/25       Patient will be independent and compliant with safe and recommended  HEP to enhance functional progress and long term management of condition.           Patient Goal       Start:  05/16/25    Expected End:  07/11/25       Patient will return to gym program without pain or restrictions due to right hip pain.

## 2025-06-05 ENCOUNTER — TREATMENT (OUTPATIENT)
Dept: PHYSICAL THERAPY | Facility: CLINIC | Age: 45
End: 2025-06-05
Payer: MEDICARE

## 2025-06-05 DIAGNOSIS — M25.551 PAIN OF RIGHT HIP: ICD-10-CM

## 2025-06-05 DIAGNOSIS — R29.898 WEAKNESS OF RIGHT LEG: Primary | ICD-10-CM

## 2025-06-05 DIAGNOSIS — M62.89 MUSCLE TIGHTNESS: ICD-10-CM

## 2025-06-05 PROCEDURE — 97110 THERAPEUTIC EXERCISES: CPT | Mod: GP | Performed by: PHYSICAL THERAPIST

## 2025-06-05 NOTE — PROGRESS NOTES
"Physical Therapy                                                                                  Physical Therapy Treatment       Patient name: Ratna Bauer  MRN:   [id number]  Today's Date: 6/5/2025  Visit 4     Time Calculation  Start Time: 1115  Stop Time: 1159  Time Calculation (min): 44 min    Assessment:  Patient completed session today with good effort.  Patient tolerated treatment well without increased complaints of pain during or after session. Progressing well with present program.   Patient will continue to benefit from PT to improve ROM and strength of right hip in order to decrease pain.       Plan:     Monitor response to treatment and adjust plan as needed.   To continue with current POC with emphasis on stretching and strengthening right hip and core.         Current Problems:  1. Weakness of right leg        2. Muscle tightness        3. Pain of right hip  Follow Up In Physical Therapy        Subjective:  Pt reports 0/10 pain upon arrival.  No new complaints.  She feels that her hip pain is gradually improving.    Response to last session:   felt good  Performing HEP: yes    Pain  'no pain just soreness' in right lateral hip at the start of session.   No pain at the end of session.           Treatment:   Therapeutic exercises 41 minutes  Airdyne upright bike, seat 4 x 7 mins     Hooklying:  Alternate opposite UE shoulder flexion and LE hip extension x 20 each way  TA bracing with B hip ADD ball squeeze 5\" hold 1 x 15 reps   Bridges with ball squeeze x 20 reps  B hip ABD bent knee fall outs with purple theraband x 20 reps  Bridges with B hip ABD with purple theraband x 20 reps   TA bracing with alt R/L bent knee raise with purple theraband x 20   Hip flexion with adduction/abduction over cone x 15 L/R    Prone hip extension SLR x 20 L/R    Sidelying:  Clamshells x 25 L/R with purple band  Hip internal rotation x 25 L/R  Hip abduction circles x 20 each way (forward and backward) " "L/R    Standing:  Sidestepping with purple band above knees x 2 laps  Lateral step ups with hip abduction SLR x 20 L/R  Forward lunge with overhead reach x 15 L/R    Not completed today:  Sidelying Hip adduction/SLR  R/L hip flexor stretch  at steps 3 x 20\"     Education:  Pt to continue current HEP as tolerated  Access Code: [email or web address]  URL: [email or web address]  Date: 05/30/2025  Prepared by: Sandra Castaneda    Exercises  - Supine Transversus Abdominis Bracing - Hands on Stomach  - 1 x daily - 7 x weekly - 2 sets - 10 reps - 5\" hold  - Supine Hip Adduction Isometric with Ball  - 1 x daily - 7 x weekly - 2 sets - 10 reps - 5\" hold  - Supine Bridge with Mini Swiss Ball Between Knees  - 1 x daily - 7 x weekly - 2 sets - 10 reps - 5\" hold  - Hooklying Clamshell with Resistance  - 1 x daily - 7 x weekly - 2 sets - 10 reps - 5\"  hold  - Supine Bridge with Resistance Band  - 1 x daily - 7 x weekly - 2 sets - 10 reps - 5 hold  - Supine March with Resistance Band  - 1 x daily - 7 x weekly - 2 sets - 10 reps          Goals  Active       PT Problem       Pain       Start:  05/16/25    Expected End:  07/11/25       Patient will report reduction of pain by 90%  to ease performance of all ADL's,  leisure activities and household tasks in order to return to PLOF.           ROM       Start:  05/16/25    Expected End:  07/11/25       Patient will increase AROM of R hip to WNL without pain in order to squat, perform all functional mobility and  return to normal fitness program at the gym without pain or restrictions.         Strength       Start:  05/16/25    Expected End:  07/11/25       Patient will increase core and R  hip strength to 5/5 without pain  in order to  participate without restriction in ADL, IADL, leisure activities, household tasks and normal fitness gym program.          HEP       Start:  05/16/25    Expected End:  07/11/25       Patient will be independent and compliant with safe and " recommended  HEP to enhance functional progress and long term management of condition.           Patient Goal       Start:  05/16/25    Expected End:  07/11/25       Patient will return to gym program without pain or restrictions due to right hip pain.               Adry Herring, [place] [id number]

## 2025-06-12 ENCOUNTER — APPOINTMENT (OUTPATIENT)
Dept: PHYSICAL THERAPY | Facility: CLINIC | Age: 45
End: 2025-06-12
Payer: MEDICARE

## 2025-06-12 DIAGNOSIS — R29.898 WEAKNESS OF RIGHT LEG: Primary | ICD-10-CM

## 2025-06-12 DIAGNOSIS — M62.89 MUSCLE TIGHTNESS: ICD-10-CM

## 2025-06-17 ENCOUNTER — DOCUMENTATION (OUTPATIENT)
Dept: PHYSICAL THERAPY | Facility: CLINIC | Age: 45
End: 2025-06-17
Payer: MEDICARE

## 2025-06-17 DIAGNOSIS — R29.898 WEAKNESS OF RIGHT LEG: Primary | ICD-10-CM

## 2025-06-17 DIAGNOSIS — M62.89 MUSCLE TIGHTNESS: ICD-10-CM

## 2025-06-17 NOTE — PROGRESS NOTES
Physical Therapy                 Therapy Communication Note    Patient Name: Ratna Bauer  MRN: [id number]  Department:   Room: Room/bed info not found  Today's Date: 6/17/2025     Discipline: Physical Therapy    Comment: Pt no showed for today's visit.  Called patient.  Patient requested to cancel her remaining PT visits at this time because she will be taking an extended vacation for about a month.  She plans to call next month to reschedule her appointments when she returns from vacation.

## 2025-06-19 ENCOUNTER — APPOINTMENT (OUTPATIENT)
Dept: PHYSICAL THERAPY | Facility: CLINIC | Age: 45
End: 2025-06-19
Payer: MEDICARE

## 2025-06-19 DIAGNOSIS — M62.89 MUSCLE TIGHTNESS: ICD-10-CM

## 2025-06-19 DIAGNOSIS — R29.898 WEAKNESS OF RIGHT LEG: Primary | ICD-10-CM

## 2025-06-25 ENCOUNTER — APPOINTMENT (OUTPATIENT)
Dept: PHYSICAL THERAPY | Facility: CLINIC | Age: 45
End: 2025-06-25
Payer: MEDICARE

## 2025-06-25 DIAGNOSIS — M62.89 MUSCLE TIGHTNESS: ICD-10-CM

## 2025-06-25 DIAGNOSIS — R29.898 WEAKNESS OF RIGHT LEG: Primary | ICD-10-CM

## 2025-06-27 ENCOUNTER — APPOINTMENT (OUTPATIENT)
Dept: PHYSICAL THERAPY | Facility: CLINIC | Age: 45
End: 2025-06-27
Payer: MEDICARE

## 2025-06-27 DIAGNOSIS — R29.898 WEAKNESS OF RIGHT LEG: Primary | ICD-10-CM

## 2025-06-27 DIAGNOSIS — M62.89 MUSCLE TIGHTNESS: ICD-10-CM

## 2025-07-02 ENCOUNTER — APPOINTMENT (OUTPATIENT)
Dept: PHYSICAL THERAPY | Facility: CLINIC | Age: 45
End: 2025-07-02
Payer: MEDICARE

## 2025-07-02 DIAGNOSIS — M62.89 MUSCLE TIGHTNESS: ICD-10-CM

## 2025-07-02 DIAGNOSIS — R29.898 WEAKNESS OF RIGHT LEG: Primary | ICD-10-CM

## 2025-07-11 ENCOUNTER — APPOINTMENT (OUTPATIENT)
Dept: PHYSICAL THERAPY | Facility: CLINIC | Age: 45
End: 2025-07-11
Payer: MEDICARE

## 2025-07-11 DIAGNOSIS — M62.89 MUSCLE TIGHTNESS: ICD-10-CM

## 2025-07-11 DIAGNOSIS — R29.898 WEAKNESS OF RIGHT LEG: Primary | ICD-10-CM

## 2025-08-18 ENCOUNTER — APPOINTMENT (OUTPATIENT)
Dept: PHYSICAL THERAPY | Facility: CLINIC | Age: 45
End: 2025-08-18
Payer: MEDICARE

## 2025-08-18 DIAGNOSIS — M62.89 MUSCLE TIGHTNESS: ICD-10-CM

## 2025-08-18 DIAGNOSIS — R29.898 WEAKNESS OF RIGHT LEG: Primary | ICD-10-CM

## 2025-08-26 ENCOUNTER — APPOINTMENT (OUTPATIENT)
Dept: PRIMARY CARE | Facility: CLINIC | Age: 45
End: 2025-08-26
Payer: MEDICARE

## 2025-08-26 VITALS
DIASTOLIC BLOOD PRESSURE: 87 MMHG | OXYGEN SATURATION: 99 % | HEART RATE: 74 BPM | WEIGHT: 154 LBS | BODY MASS INDEX: 27.29 KG/M2 | SYSTOLIC BLOOD PRESSURE: 126 MMHG | HEIGHT: 63 IN

## 2025-08-26 DIAGNOSIS — M54.31 SCIATICA OF RIGHT SIDE: Primary | ICD-10-CM

## 2025-08-26 DIAGNOSIS — K21.00 GASTROESOPHAGEAL REFLUX DISEASE WITH ESOPHAGITIS WITHOUT HEMORRHAGE: ICD-10-CM

## 2025-08-26 PROCEDURE — 3008F BODY MASS INDEX DOCD: CPT | Performed by: NURSE PRACTITIONER

## 2025-08-26 PROCEDURE — 99214 OFFICE O/P EST MOD 30 MIN: CPT | Performed by: NURSE PRACTITIONER

## 2025-08-26 PROCEDURE — 1036F TOBACCO NON-USER: CPT | Performed by: NURSE PRACTITIONER

## 2025-08-26 RX ORDER — PANTOPRAZOLE SODIUM 40 MG/1
40 TABLET, DELAYED RELEASE ORAL DAILY
Qty: 90 TABLET | Refills: 3 | Status: SHIPPED | OUTPATIENT
Start: 2025-08-26

## 2025-08-26 ASSESSMENT — ENCOUNTER SYMPTOMS
HIP PAIN: 1
OCCASIONAL FEELINGS OF UNSTEADINESS: 0
LOSS OF SENSATION IN FEET: 0
DEPRESSION: 0

## 2025-08-26 ASSESSMENT — PATIENT HEALTH QUESTIONNAIRE - PHQ9
2. FEELING DOWN, DEPRESSED OR HOPELESS: NOT AT ALL
SUM OF ALL RESPONSES TO PHQ9 QUESTIONS 1 AND 2: 0
1. LITTLE INTEREST OR PLEASURE IN DOING THINGS: NOT AT ALL

## 2025-08-27 ENCOUNTER — TREATMENT (OUTPATIENT)
Dept: PHYSICAL THERAPY | Facility: CLINIC | Age: 45
End: 2025-08-27
Payer: MEDICARE

## 2025-08-27 DIAGNOSIS — M62.89 MUSCLE TIGHTNESS: ICD-10-CM

## 2025-08-27 DIAGNOSIS — R29.898 WEAKNESS OF RIGHT LEG: Primary | ICD-10-CM

## 2025-08-27 DIAGNOSIS — M25.551 PAIN OF RIGHT HIP: ICD-10-CM

## 2025-08-27 PROCEDURE — 97110 THERAPEUTIC EXERCISES: CPT | Mod: GP,CQ

## 2025-09-02 ENCOUNTER — APPOINTMENT (OUTPATIENT)
Dept: PRIMARY CARE | Facility: CLINIC | Age: 45
End: 2025-09-02
Payer: MEDICARE

## 2025-09-16 ENCOUNTER — APPOINTMENT (OUTPATIENT)
Dept: PRIMARY CARE | Facility: CLINIC | Age: 45
End: 2025-09-16
Payer: MEDICARE